# Patient Record
Sex: MALE | Race: WHITE | NOT HISPANIC OR LATINO | Employment: PART TIME | URBAN - METROPOLITAN AREA
[De-identification: names, ages, dates, MRNs, and addresses within clinical notes are randomized per-mention and may not be internally consistent; named-entity substitution may affect disease eponyms.]

---

## 2021-03-12 ENCOUNTER — OFFICE VISIT (OUTPATIENT)
Dept: FAMILY MEDICINE CLINIC | Facility: CLINIC | Age: 21
End: 2021-03-12
Payer: COMMERCIAL

## 2021-03-12 VITALS
HEIGHT: 68 IN | HEART RATE: 86 BPM | OXYGEN SATURATION: 97 % | SYSTOLIC BLOOD PRESSURE: 118 MMHG | BODY MASS INDEX: 28.1 KG/M2 | WEIGHT: 185.38 LBS | DIASTOLIC BLOOD PRESSURE: 84 MMHG | TEMPERATURE: 98.5 F | RESPIRATION RATE: 18 BRPM

## 2021-03-12 DIAGNOSIS — B35.9 TINEA: ICD-10-CM

## 2021-03-12 DIAGNOSIS — L50.9 URTICARIA: Primary | ICD-10-CM

## 2021-03-12 PROBLEM — Z72.0 TOBACCO USE: Status: ACTIVE | Noted: 2017-10-20

## 2021-03-12 PROCEDURE — 3008F BODY MASS INDEX DOCD: CPT | Performed by: FAMILY MEDICINE

## 2021-03-12 PROCEDURE — 3725F SCREEN DEPRESSION PERFORMED: CPT | Performed by: FAMILY MEDICINE

## 2021-03-12 PROCEDURE — 1036F TOBACCO NON-USER: CPT | Performed by: FAMILY MEDICINE

## 2021-03-12 PROCEDURE — 99203 OFFICE O/P NEW LOW 30 MIN: CPT | Performed by: FAMILY MEDICINE

## 2021-03-12 RX ORDER — PREDNISONE 20 MG/1
TABLET ORAL
Qty: 17 TABLET | Refills: 0 | Status: SHIPPED | OUTPATIENT
Start: 2021-03-12 | End: 2021-04-12 | Stop reason: ALTCHOICE

## 2021-03-12 RX ORDER — LEVOCETIRIZINE DIHYDROCHLORIDE 5 MG/1
TABLET, FILM COATED ORAL
COMMUNITY
Start: 2021-03-04 | End: 2021-05-03 | Stop reason: ALTCHOICE

## 2021-03-12 RX ORDER — TERBINAFINE HYDROCHLORIDE 250 MG/1
250 TABLET ORAL DAILY
Qty: 10 TABLET | Refills: 0 | Status: SHIPPED | OUTPATIENT
Start: 2021-03-12 | End: 2021-03-22

## 2021-03-12 NOTE — ASSESSMENT & PLAN NOTE
Tinea  Patient with suspected fungal infection on skin  Once he is done with prednisone patient was notified to obtain blood work for hepatic function panel  If this is within normal limits he may try Lamisil 250 mg once daily for 10 days to see if this improves his tinea infection

## 2021-03-12 NOTE — PROGRESS NOTES
FAMILY PRACTICE OFFICE VISIT       NAME: Jean Bravo  AGE: 23 y o  SEX: male       : 2000        MRN: [id number]    DATE: 3/26/2021  TIME: 12:58 PM    Assessment and Plan     Problem List Items Addressed This Visit        Musculoskeletal and Integument    Urticaria - Primary      Urticaria  Patient with suspected urticarial of type of rash  Was given prescription for prednisone tapering dose consisting of 40 mg x 5 days, 20 x 5 days, 10 x 4 days  Patient will call if symptoms recur or persist whereby he may require consultation with allergist         Relevant Medications    predniSONE 20 mg tablet    Tinea      Tinea  Patient with suspected fungal infection on skin  Once he is done with prednisone patient was notified to obtain blood work for hepatic function panel  If this is within normal limits he may try Lamisil 250 mg once daily for 10 days to see if this improves his tinea infection  Relevant Orders    Hepatic function panel              Chief Complaint     Chief Complaint   Patient presents with    Establish Care     new pt     rash on body       History of Present Illness       This is the 1st office visit for patient who describes chronic intermittent history breaking out in pruritic rash on his arms, trunk, and back  Usually it is triggered by being overheated  He has tried  levocetirizine without relief in symptoms  He also describes chronic history of annular white spots on arms and trunk that are non pruritic and tend to improve in the summer time with sun exposure  He denies any recent illness  He is unaware of any triggers for either rash such as new pads, change in clothing, laundry detergent, body wash etc      Review of Systems   Review of Systems   Constitutional: Negative  Respiratory: Negative  Cardiovascular: Negative  Gastrointestinal: Negative  Skin: Positive for rash  Neurological: Negative          Active Problem List     Patient Active Problem List   Diagnosis    Attention deficit disorder with hyperactivity    Social phobia    Tobacco use    Urticaria    Tinea       Past Medical History:  History reviewed  No pertinent past medical history  Past Surgical History:  History reviewed  No pertinent surgical history      Family History:  Family History   Problem Relation Age of Onset    Heart disease Mother     Hypertension Mother     No Known Problems Father     No Known Problems Sister     No Known Problems Brother        Social History:  Social History     Socioeconomic History    Marital status: Single     Spouse name: Not on file    Number of children: Not on file    Years of education: Not on file    Highest education level: Not on file   Occupational History    Not on file   Social Needs    Financial resource strain: Not on file    Food insecurity     Worry: Not on file     Inability: Not on file    Transportation needs     Medical: Not on file     Non-medical: Not on file   Tobacco Use    Smoking status: Former Smoker    Smokeless tobacco: Never Used   Substance and Sexual Activity    Alcohol use: Yes     Frequency: Monthly or less    Drug use: Never    Sexual activity: Not on file   Lifestyle    Physical activity     Days per week: Not on file     Minutes per session: Not on file    Stress: Not on file   Relationships    Social connections     Talks on phone: Not on file     Gets together: Not on file     Attends Faith service: Not on file     Active member of club or organization: Not on file     Attends meetings of clubs or organizations: Not on file     Relationship status: Not on file    Intimate partner violence     Fear of current or ex partner: Not on file     Emotionally abused: Not on file     Physically abused: Not on file     Forced sexual activity: Not on file   Other Topics Concern    Not on file   Social History Narrative    Not on file       Objective     Vitals:    03/12/21 0923   BP: 118/84   Pulse: 86   Resp: 18   Temp: 98 5 °F (36 9 °C)   SpO2: 97%     Wt Readings from Last 3 Encounters:   03/12/21 84 1 kg (185 lb 6 oz)       Physical Exam  Constitutional:       General: He is not in acute distress  Appearance: Normal appearance  He is not ill-appearing  HENT:      Head: Normocephalic and atraumatic  Cardiovascular:      Rate and Rhythm: Normal rate and regular rhythm  Heart sounds: Normal heart sounds  No murmur  Pulmonary:      Effort: Pulmonary effort is normal       Breath sounds: Normal breath sounds  No wheezing, rhonchi or rales  Lymphadenopathy:      Cervical: No cervical adenopathy  Skin:     General: Skin is warm and dry  Coloration: Skin is not pale  Findings: Rash present  No bruising, erythema or lesion  Comments: Patient with scattered white annular macule on forearms trunk and back of varying sizes  I also reviewed photographs of rash that he experiences which appears to be compliant red and scattered on trunk and arms on occasions  At this time patient had no obvious red rash to observe   Neurological:      General: No focal deficit present  Mental Status: He is alert and oriented to person, place, and time  Psychiatric:         Mood and Affect: Mood normal          Behavior: Behavior normal          Thought Content: Thought content normal          Judgment: Judgment normal          Pertinent Laboratory/Diagnostic Studies:  No results found for: GLUCOSE, BUN, CREATININE, CALCIUM, NA, K, CO2, CL  No results found for: ALT, AST, GGT, ALKPHOS, BILITOT    No results found for: WBC, HGB, HCT, MCV, PLT    No results found for: TSH    No results found for: CHOL  No results found for: TRIG  No results found for: HDL  No results found for: LDLCALC  No results found for: HGBA1C    No results found for this or any previous visit      Orders Placed This Encounter   Procedures    Hepatic function panel       ALLERGIES:  No Known Allergies    Current Medications     Current Outpatient Medications   Medication Sig Dispense Refill    levocetirizine (XYZAL) 5 MG tablet TAKE 1 TABLET BY MOUTH ONCE DAILY TO TWICE DAILY      hydrOXYzine HCL (ATARAX) 25 mg tablet Take 1 tablet (25 mg total) by mouth 3 (three) times a day as needed for itching 45 tablet 1    predniSONE 20 mg tablet 2 tabs X 5 days, 1 tab X 5 tabs, 1/2 tab X 4 tabs 17 tablet 0     No current facility-administered medications for this visit            Health Maintenance     Health Maintenance   Topic Date Due    DTaP,Tdap,and Td Vaccines (6 - Tdap) 06/10/2009    HIV Screening  Never done    COVID-19 Vaccine (1) Never done    BMI: Followup Plan  Never done    Annual Physical  Never done    Influenza Vaccine (1) 09/01/2020    Depression Screening PHQ  03/12/2022    BMI: Adult  03/12/2022    HIB Vaccine  Completed    Hepatitis B Vaccine  Completed    IPV Vaccine  Completed    Hepatitis A Vaccine  Completed    Meningococcal ACWY Vaccine  Completed    HPV Vaccine  Completed    Pneumococcal Vaccine: Pediatrics (0 to 5 Years) and At-Risk Patients (6 to 59 Years)  Aged [employer] History   Administered Date(s) Administered    DTaP 1998, 1998, 1998, 06/30/2000, 04/09/2003    HPV Quadrivalent 08/22/2013, 05/06/2014, 08/29/2014    Hep B, Adolescent or Pediatric 1998, 1998, 06/30/2000    Hepatitis A 08/22/2013, 05/06/2014    HiB 1998, 1998, 1998, 11/08/1999    INFLUENZA 08/22/2013, 08/29/2014    IPV 1998, 1998, 06/30/2000, 04/09/2003    MMR 11/08/1999, 04/09/2003    Meningococcal MCV4P 08/29/2014    Pneumococcal Conjugate PCV 7 06/30/2000    Varicella 94/79/8179       Mya Pantoja MD

## 2021-03-12 NOTE — ASSESSMENT & PLAN NOTE
Urticaria  Patient with suspected urticarial of type of rash  Was given prescription for prednisone tapering dose consisting of 40 mg x 5 days, 20 x 5 days, 10 x 4 days     Patient will call if symptoms recur or persist whereby he may require consultation with allergist

## 2021-03-26 ENCOUNTER — TELEPHONE (OUTPATIENT)
Dept: FAMILY MEDICINE CLINIC | Facility: CLINIC | Age: 21
End: 2021-03-26

## 2021-03-26 DIAGNOSIS — L50.9 URTICARIA: Primary | ICD-10-CM

## 2021-03-26 RX ORDER — HYDROXYZINE HYDROCHLORIDE 25 MG/1
25 TABLET, FILM COATED ORAL 3 TIMES DAILY PRN
Qty: 45 TABLET | Refills: 1 | Status: SHIPPED | OUTPATIENT
Start: 2021-03-26 | End: 2021-04-12 | Stop reason: ALTCHOICE

## 2021-03-26 NOTE — TELEPHONE ENCOUNTER
We may try different medications such as hydroxyzine  If this does not start to help patient may require dermatology office visit    I will send to pharmacy and he may use as directed up to 3 times daily

## 2021-03-26 NOTE — TELEPHONE ENCOUNTER
Patient called and stated that neither of his medications have been working on his rash  He said some days are better than others and he is on the lower dose of the prednisone but it is starting to get worse again and he broke out 3-4 times this morning all over his body and it is very itchy,  Is there something else that he could do for this?  Please call to advise

## 2021-04-12 ENCOUNTER — TELEMEDICINE (OUTPATIENT)
Dept: FAMILY MEDICINE CLINIC | Facility: CLINIC | Age: 21
End: 2021-04-12
Payer: COMMERCIAL

## 2021-04-12 VITALS — WEIGHT: 185.6 LBS | HEIGHT: 68 IN | TEMPERATURE: 97.3 F | BODY MASS INDEX: 28.13 KG/M2

## 2021-04-12 DIAGNOSIS — U07.1 COVID-19: Primary | ICD-10-CM

## 2021-04-12 PROCEDURE — 99212 OFFICE O/P EST SF 10 MIN: CPT | Performed by: NURSE PRACTITIONER

## 2021-04-12 PROCEDURE — 1036F TOBACCO NON-USER: CPT | Performed by: NURSE PRACTITIONER

## 2021-04-12 NOTE — LETTER
April 12, 2021     Patient: [place]   YOB: 2000   Date of Visit: 4/12/2021       To Whom it May Concern:    [place] is under my professional care  He was seen in my office on 4/12/2021  He may return to work on 04/13/21  If you have any questions or concerns, please don't hesitate to call           Sincerely,          SUSIE Dobbins        CC: No Recipients

## 2021-04-12 NOTE — PROGRESS NOTES
COVID-19 Outpatient Progress Note    Assessment/Plan:    Problem List Items Addressed This Visit     None      Visit Diagnoses     COVID-19    -  Primary         Disposition: This 70-year-old male presents today for follow-up COVID 19 virus  Symptoms began on March 31st   Tested positive for COVID-19 on April 5th  He has been home isolating  All symptoms have resolved  No fever for more than 24 hours  It has been greater than 10 days since onset of symptoms  He may return to work  Work note emailed to patient as requested, [email or web address]  He will call with any further questions or concerns  I have spent 6 minutes directly with the patient  Encounter provider Amy Alfaro    Provider located at [address] [place] [address]    Recent Visits  No visits were found meeting these conditions  Showing recent visits within past 7 days and meeting all other requirements     Today's Visits  Date Type Provider Dept   04/12/21 Telemedicine Amy Waggoner, [address] [place] [address] today's visits and meeting all other requirements     Future Appointments  No visits were found meeting these conditions  Showing future appointments within next 150 days and meeting all other requirements      This virtual check-in was done via [employer] and patient was informed that this is a secure, HIPAA-compliant platform  He agrees to proceed  Patient agrees to participate in a virtual check in via telephone or video visit instead of presenting to the office to address urgent/immediate medical needs  Patient is aware this is a billable service  After connecting through [facility], the patient was identified by name and date of birth  Marbecca Alston was informed that this was a telemedicine visit and that the exam was being conducted confidentially over secure lines  My office door was closed  No one else was in the room   Wisam Pepper Shelly acknowledged consent and understanding of privacy and security of the telemedicine visit  I informed the patient that I have reviewed his record in Epic and presented the opportunity for him to ask any questions regarding the visit today  The patient agreed to participate  Subjective: James Bates is a 23 y o  male who has been screened for COVID-19  Symptom change since last report: resolving  Patient denies fever, chills, fatigue, malaise, congestion, rhinorrhea, sore throat, anosmia, loss of taste, cough, shortness of breath, chest tightness, abdominal pain, nausea, vomiting, diarrhea, myalgias and headaches  Anselmo Lee has been staying home and has isolated themselves in his home  He is taking care to not share personal items and is cleaning all surfaces that are touched often, like counters, tabletops, and doorknobs using household cleaning sprays or wipes  He is wearing a mask when he leaves his room  Date of symptom onset: 3/31/2021  Date of positive COVID-19 PCR: 4/5/2021    Started with symptoms of a head cold on March 31st, next day April 1st started with body aches  Other family members were positive for COVID-19  Tested negative for COVID-19 on 03/30  An tested positive for COVID-19 on April 5th  He has been home self isolating  States symptoms were the worst in the 1st 3-4 days  Temperature max 99  For the past 6-7 days he has felt good, and all symptoms have resolved  No fever for more than 24 hours  He requires a note to return to work  Lab Results   Component Value Date    SARSCOV2 Negative 02/04/2021     No past medical history on file  No past surgical history on file  Current Outpatient Medications   Medication Sig Dispense Refill    levocetirizine (XYZAL) 5 MG tablet TAKE 1 TABLET BY MOUTH ONCE DAILY TO TWICE DAILY       No current facility-administered medications for this visit        No Known Allergies    Review of Systems   Constitutional: Negative for chills, fatigue and fever  HENT: Negative for congestion, rhinorrhea and sore throat  Respiratory: Negative for cough, chest tightness and shortness of breath  Gastrointestinal: Negative for abdominal pain, diarrhea, nausea and vomiting  Musculoskeletal: Negative for myalgias  Neurological: Negative for headaches  Objective:    Vitals:    04/12/21 1006   Temp: (!) 97 3 °F (36 3 °C)   TempSrc: Oral   Weight: 84 2 kg (185 lb 9 6 oz)   Height: 5' 7 72" (1 72 m)       Physical Exam  Vitals signs and nursing note reviewed  Constitutional:       General: He is not in acute distress  Appearance: Normal appearance  He is not ill-appearing, toxic-appearing or diaphoretic  HENT:      Head: Normocephalic and atraumatic  Pulmonary:      Effort: Pulmonary effort is normal  No respiratory distress  Comments:   No cough  No tachypnea  Neurological:      Mental Status: He is alert  Psychiatric:         Mood and Affect: Mood normal        VIRTUAL VISIT DISCLAIMER    June Omalley acknowledges that he has consented to an online visit or consultation  He understands that the online visit is based solely on information provided by him, and that, in the absence of a face-to-face physical evaluation by the physician, the diagnosis he receives is both limited and provisional in terms of accuracy and completeness  This is not intended to replace a full medical face-to-face evaluation by the physician  June Omalley understands and accepts these terms

## 2021-05-03 ENCOUNTER — OFFICE VISIT (OUTPATIENT)
Dept: FAMILY MEDICINE CLINIC | Facility: CLINIC | Age: 21
End: 2021-05-03
Payer: COMMERCIAL

## 2021-05-03 VITALS
SYSTOLIC BLOOD PRESSURE: 110 MMHG | HEIGHT: 68 IN | BODY MASS INDEX: 26.86 KG/M2 | WEIGHT: 177.2 LBS | TEMPERATURE: 98.7 F | OXYGEN SATURATION: 97 % | DIASTOLIC BLOOD PRESSURE: 70 MMHG | HEART RATE: 70 BPM | RESPIRATION RATE: 16 BRPM

## 2021-05-03 DIAGNOSIS — B35.4 TINEA CORPORIS: Primary | ICD-10-CM

## 2021-05-03 PROCEDURE — 3008F BODY MASS INDEX DOCD: CPT | Performed by: NURSE PRACTITIONER

## 2021-05-03 PROCEDURE — 99213 OFFICE O/P EST LOW 20 MIN: CPT | Performed by: NURSE PRACTITIONER

## 2021-05-03 NOTE — PROGRESS NOTES
FAMILY PRACTICE OFFICE VISIT       NAME: Jean Bravo  AGE: 23 y o  SEX: male       : 2000        MRN: [id number]    Assessment and Plan     Problem List Items Addressed This Visit     None      Visit Diagnoses     Tinea corporis    -  Primary          1  Tinea corporis       This 22-year-old male presents today for a rash that is consistent with tinea corporis  Review of previous records indicates he saw Dr Agustín Hernandez for this rash in March  He was advised to proceed with hepatic function panel, if hepatic function panel was within normal limits, terbinafine would be prescribed  Unfortunately, he has not proceeded with blood work as previously requested  He is reluctant to obtain blood work  Explained the importance of completing this blood work prior to starting terbinafine medication  We discussed possibility of topical treatment, but given extensive area of rash, I would recommend proceeding with blood work and terbinafine treatment  Patient is agreeable  Will complete blood work in the next 1-2 days  If hepatic function is within normal limits, can proceed with terbinafine treatment, as per Dr Dimple Cobian note on   Chief Complaint     Chief Complaint   Patient presents with    Rash     Pt is here for all over body rash       History of Present Illness     Tommy Page  Is a 22-year-old male presenting today for a rash that has been present for the past 1-1 5 years  Rash is not itchy or painful  Rash started out as a few dots on his abdomen, but now is all over arms, legs, trunk, groin  No one else in his household has this rash  He was seen by Dr Agustín Hernandez few weeks ago for hives  Hives have resolved  This rash is different  Review of Systems   Review of Systems   Constitutional: Negative  Skin: Positive for rash         Active Problem List     Patient Active Problem List   Diagnosis    Attention deficit disorder with hyperactivity    Social phobia    Tobacco use    Urticaria    Tinea       Past Medical History:  No past medical history on file  Past Surgical History:  No past surgical history on file  Family History:  Family History   Problem Relation Age of Onset    Heart disease Mother     Hypertension Mother     No Known Problems Father     No Known Problems Sister     No Known Problems Brother        Social History:  Social History     Socioeconomic History    Marital status: Single     Spouse name: Not on file    Number of children: Not on file    Years of education: Not on file    Highest education level: Not on file   Occupational History    Not on file   Social Needs    Financial resource strain: Not on file    Food insecurity     Worry: Not on file     Inability: Not on file    Transportation needs     Medical: Not on file     Non-medical: Not on file   Tobacco Use    Smoking status: Former Smoker    Smokeless tobacco: Never Used   Substance and Sexual Activity    Alcohol use: Not Currently     Frequency: Monthly or less    Drug use: Never    Sexual activity: Not on file   Lifestyle    Physical activity     Days per week: Not on file     Minutes per session: Not on file    Stress: Not on file   Relationships    Social connections     Talks on phone: Not on file     Gets together: Not on file     Attends Anabaptist service: Not on file     Active member of club or organization: Not on file     Attends meetings of clubs or organizations: Not on file     Relationship status: Not on file    Intimate partner violence     Fear of current or ex partner: Not on file     Emotionally abused: Not on file     Physically abused: Not on file     Forced sexual activity: Not on file   Other Topics Concern    Not on file   Social History Narrative    Not on file       I have reviewed the patient's medical history in detail; there are no changes to the history as noted in the electronic medical record      Objective     Vitals: 05/03/21 0902   BP: 110/70   Pulse: 70   Resp: 16   Temp: 98 7 °F (37 1 °C)   TempSrc: Temporal   SpO2: 97%   Weight: 80 4 kg (177 lb 3 2 oz)   Height: 5' 7 72" (1 72 m)     Wt Readings from Last 3 Encounters:   05/03/21 80 4 kg (177 lb 3 2 oz)   04/12/21 84 2 kg (185 lb 9 6 oz)   03/12/21 84 1 kg (185 lb 6 oz)     Physical Exam  Vitals signs and nursing note reviewed  Constitutional:       General: He is not in acute distress  Appearance: Normal appearance  He is not ill-appearing, toxic-appearing or diaphoretic  HENT:      Head: Normocephalic and atraumatic  Cardiovascular:      Rate and Rhythm: Normal rate  Pulmonary:      Effort: Pulmonary effort is normal  No respiratory distress  Skin:     Comments:  Multiple scattered annular, slightly raised, erythematous areas of rash chest, abdomen, back, groin, bilateral lower extremities, bilateral upper extremities  Neurological:      Mental Status: He is alert  Psychiatric:         Mood and Affect: Mood normal        BMI Counseling: Body mass index is 27 17 kg/m²  The BMI is above normal  Exercise recommendations include exercising 3-5 times per week and obtaining a gym membership  Exercises regularly at the gym  ALLERGIES:  No Known Allergies    Current Medications     Current Outpatient Medications   Medication Sig Dispense Refill    terbinafine (LamISIL) 250 mg tablet Take 1 tablet (250 mg total) by mouth daily 30 tablet 2     No current facility-administered medications for this visit            Health Maintenance     Health Maintenance   Topic Date Due    HIV Screening  Never done    COVID-19 Vaccine (1) Never done    BMI: Followup Plan  Never done    Annual Physical  Never done    Influenza Vaccine (Season Ended) 09/01/2021    Depression Screening PHQ  03/12/2022    BMI: Adult  05/03/2022    DTaP,Tdap,and Td Vaccines (7 - Td) 01/11/2028    HIB Vaccine  Completed    Hepatitis B Vaccine  Completed    IPV Vaccine  Completed  Hepatitis A Vaccine  Completed    Meningococcal ACWY Vaccine  Completed    HPV Vaccine  Completed    Pneumococcal Vaccine: Pediatrics (0 to 5 Years) and At-Risk Patients (6 to 59 Years)  Aged [employer] History   Administered Date(s) Administered    Adenovirus, unspecified 01/11/2018    DTaP 1998, 1998, 1998, 06/30/2000, 04/09/2003    HPV Quadrivalent 08/22/2013, 05/06/2014, 08/29/2014    Hep B, Adolescent or Pediatric 1998, 1998, 06/30/2000    Hep B, adult 02/28/2019    Hepatitis A 08/22/2013, 05/06/2014    HiB 1998, 1998, 1998, 11/08/1999    INFLUENZA 08/22/2013, 08/29/2014    IPV 1998, 1998, 06/30/2000, 04/09/2003, 02/15/2018    Influenza Injectable, MDCK, Preservative Free, Quadrivalent, 0 5 mL 01/11/2018    Influenza Quadrivalent 3 years and older 11/15/2018    MMR 11/08/1999, 04/09/2003    Meningococcal Conjugate (MCV4O) 01/11/2018    Meningococcal MCV4P 08/29/2014    Pneumococcal Conjugate PCV 7 06/30/2000    Tdap 01/11/2018    Tuberculin Skin Test-PPD Intradermal 01/11/2018    Varicella 11/08/1999    Yellow Fever 02/27/2019       SUSIE Meza

## 2021-05-05 ENCOUNTER — TELEPHONE (OUTPATIENT)
Dept: FAMILY MEDICINE CLINIC | Facility: CLINIC | Age: 21
End: 2021-05-05

## 2021-05-05 DIAGNOSIS — B35.4 TINEA CORPORIS: Primary | ICD-10-CM

## 2021-05-05 RX ORDER — TERBINAFINE HYDROCHLORIDE 250 MG/1
250 TABLET ORAL DAILY
Qty: 30 TABLET | Refills: 2 | Status: SHIPPED | OUTPATIENT
Start: 2021-05-05 | End: 2021-06-04

## 2021-05-05 NOTE — TELEPHONE ENCOUNTER
Patient called, wants to know if you can order the antifungal mediation for him and send it to [facility] on [address]

## 2021-05-13 ENCOUNTER — TELEPHONE (OUTPATIENT)
Dept: FAMILY MEDICINE CLINIC | Facility: CLINIC | Age: 21
End: 2021-05-13

## 2021-05-13 NOTE — TELEPHONE ENCOUNTER
Patient called to follow up on his persistent rash  Dr Agustín Hernandez ordered patient terbinafine 250 mg tablet and advised patient to take for 10 days, per OV note on 3/12/21  Pt began taking terbinafine (unclear if for the first time or if pt re-started medication) on 5/5/21 and is still not noticing improvements  Patient wants to know if he should stop taking the medication on day 10, or if he should continue taking the medication since he has a 30-day supply  I scheduled patient for an OV on 1/66/50 with Dr Agustín Hernandez

## 2021-05-18 ENCOUNTER — OFFICE VISIT (OUTPATIENT)
Dept: FAMILY MEDICINE CLINIC | Facility: CLINIC | Age: 21
End: 2021-05-18
Payer: COMMERCIAL

## 2021-05-18 VITALS
WEIGHT: 174 LBS | TEMPERATURE: 98.7 F | RESPIRATION RATE: 18 BRPM | BODY MASS INDEX: 26.37 KG/M2 | DIASTOLIC BLOOD PRESSURE: 80 MMHG | HEART RATE: 82 BPM | SYSTOLIC BLOOD PRESSURE: 130 MMHG | OXYGEN SATURATION: 97 % | HEIGHT: 68 IN

## 2021-05-18 DIAGNOSIS — L30.9 DERMATITIS: Primary | ICD-10-CM

## 2021-05-18 PROCEDURE — 3008F BODY MASS INDEX DOCD: CPT | Performed by: FAMILY MEDICINE

## 2021-05-18 PROCEDURE — 99213 OFFICE O/P EST LOW 20 MIN: CPT | Performed by: FAMILY MEDICINE

## 2021-05-18 PROCEDURE — 1036F TOBACCO NON-USER: CPT | Performed by: FAMILY MEDICINE

## 2021-05-18 RX ORDER — FLUCONAZOLE 200 MG/1
200 TABLET ORAL DAILY
Qty: 14 TABLET | Refills: 0 | Status: SHIPPED | OUTPATIENT
Start: 2021-05-18 | End: 2021-06-01

## 2021-05-19 NOTE — ASSESSMENT & PLAN NOTE
Dermatitis  We discussed did possible etiologies of eczema versus tinea versus psoriasis  Was given prescription to try Diflucan 200 mg once daily for up to 2 weeks  If symptoms persist without improvement he was given referral to local dermatologist for further evaluation

## 2021-05-19 NOTE — PROGRESS NOTES
FAMILY PRACTICE OFFICE VISIT       NAME: Jean Bravo  AGE: 23 y o  SEX: male       : 2000        MRN: [id number]    DATE: 2021  TIME: 9:52 AM    Assessment and Plan     Problem List Items Addressed This Visit        Musculoskeletal and Integument    Dermatitis - Primary      Dermatitis  We discussed did possible etiologies of eczema versus tinea versus psoriasis  Was given prescription to try Diflucan 200 mg once daily for up to 2 weeks  If symptoms persist without improvement he was given referral to local dermatologist for further evaluation  Relevant Medications    fluconazole (DIFLUCAN) 200 mg tablet              Chief Complaint     Chief Complaint   Patient presents with    Rash     systemic   Red, raised  Does not itch       History of Present Illness      Patient in the office for follow-up chronic dermatitis  He states overall his urticaria has improved after treatment of prednisone however he continues to have lingering rash scattered throughout his body primarily on trunk and upper thighs of legs  Occasionally rash is mildly pruritic  He states any time some light in the summer time makes rash more noticeable  He denies any recent illness    Rash        Review of Systems   Review of Systems   Constitutional: Negative  Skin: Positive for rash  Active Problem List     Patient Active Problem List   Diagnosis    Attention deficit disorder with hyperactivity    Social phobia    Tobacco use    Urticaria    Tinea    Dermatitis       Past Medical History:  History reviewed  No pertinent past medical history      Past Surgical History:  Past Surgical History:   Procedure Laterality Date    WISDOM TOOTH EXTRACTION Bilateral        Family History:  Family History   Problem Relation Age of Onset    Heart disease Mother     Hypertension Mother     No Known Problems Father     No Known Problems Sister     No Known Problems Brother        Social History:  Social History     Socioeconomic History    Marital status: Single     Spouse name: Not on file    Number of children: Not on file    Years of education: Not on file    Highest education level: Not on file   Occupational History    Not on file   Social Needs    Financial resource strain: Not on file    Food insecurity     Worry: Not on file     Inability: Not on file    Transportation needs     Medical: Not on file     Non-medical: Not on file   Tobacco Use    Smoking status: Former Smoker    Smokeless tobacco: Never Used   Substance and Sexual Activity    Alcohol use: Not Currently     Frequency: Monthly or less    Drug use: Never    Sexual activity: Not on file   Lifestyle    Physical activity     Days per week: Not on file     Minutes per session: Not on file    Stress: Not on file   Relationships    Social connections     Talks on phone: Not on file     Gets together: Not on file     Attends Taoist service: Not on file     Active member of club or organization: Not on file     Attends meetings of clubs or organizations: Not on file     Relationship status: Not on file    Intimate partner violence     Fear of current or ex partner: Not on file     Emotionally abused: Not on file     Physically abused: Not on file     Forced sexual activity: Not on file   Other Topics Concern    Not on file   Social History Narrative    Not on file       Objective     Vitals:    05/18/21 1645   BP: 130/80   Pulse: 82   Resp: 18   Temp: 98 7 °F (37 1 °C)   SpO2: 97%     Wt Readings from Last 3 Encounters:   05/18/21 78 9 kg (174 lb)   05/03/21 80 4 kg (177 lb 3 2 oz)   04/12/21 84 2 kg (185 lb 9 6 oz)       Physical Exam  Constitutional:       General: He is not in acute distress  Appearance: Normal appearance  He is not ill-appearing  Skin:     Comments: Patient with scattered lesions that are pink in nature and dry  They are annular in varying sizes approximately 1/4 to 1/2 cm in diameter    Lesions are worse on trunk, back, and upper thigh area of legs  Neurological:      Mental Status: He is alert  Pertinent Laboratory/Diagnostic Studies:  No results found for: GLUCOSE, BUN, CREATININE, CALCIUM, NA, K, CO2, CL  No results found for: ALT, AST, GGT, ALKPHOS, BILITOT    No results found for: WBC, HGB, HCT, MCV, PLT    No results found for: TSH    No results found for: CHOL  No results found for: TRIG  No results found for: HDL  No results found for: LDLCALC  No results found for: HGBA1C    No results found for this or any previous visit  No orders of the defined types were placed in this encounter  ALLERGIES:  Allergies   Allergen Reactions    Other Allergic Rhinitis     Seasonal Allergies        Current Medications     Current Outpatient Medications   Medication Sig Dispense Refill    fluconazole (DIFLUCAN) 200 mg tablet Take 1 tablet (200 mg total) by mouth daily for 14 days 14 tablet 0    terbinafine (LamISIL) 250 mg tablet Take 1 tablet (250 mg total) by mouth daily (Patient not taking: Reported on 5/18/2021) 30 tablet 2     No current facility-administered medications for this visit            Health Maintenance     Health Maintenance   Topic Date Due    HIV Screening  Never done    Annual Physical  Never done    COVID-19 Vaccine (1) 08/18/2021 (Originally 6/10/2010)    Influenza Vaccine (Season Ended) 09/01/2021    Depression Screening PHQ  03/12/2022    BMI: Followup Plan  05/06/2022    BMI: Adult  05/18/2022    DTaP,Tdap,and Td Vaccines (7 - Td) 01/11/2028    HIB Vaccine  Completed    Hepatitis B Vaccine  Completed    IPV Vaccine  Completed    Hepatitis A Vaccine  Completed    Meningococcal ACWY Vaccine  Completed    HPV Vaccine  Completed    Pneumococcal Vaccine: Pediatrics (0 to 5 Years) and At-Risk Patients (6 to 59 Years)  Aged [employer] History   Administered Date(s) Administered    Adenovirus, unspecified 01/11/2018    DTaP 1998, 1998, 1998, 06/30/2000, 04/09/2003    HPV Quadrivalent 08/22/2013, 05/06/2014, 08/29/2014    Hep B, Adolescent or Pediatric 1998, 1998, 06/30/2000    Hep B, adult 02/28/2019    Hepatitis A 08/22/2013, 05/06/2014    HiB 1998, 1998, 1998, 11/08/1999    INFLUENZA 08/22/2013, 08/29/2014    IPV 1998, 1998, 06/30/2000, 04/09/2003, 02/15/2018    Influenza Injectable, MDCK, Preservative Free, Quadrivalent, 0 5 mL 01/11/2018    Influenza Quadrivalent 3 years and older 11/15/2018    MMR 11/08/1999, 04/09/2003    Meningococcal Conjugate (MCV4O) 01/11/2018    Meningococcal MCV4P 08/29/2014    Pneumococcal Conjugate PCV 7 06/30/2000    Tdap 01/11/2018    Tuberculin Skin Test-PPD Intradermal 01/11/2018    Varicella 11/08/1999    Yellow Fever 75/14/4821       Lakia Hi MD